# Patient Record
Sex: MALE | Race: WHITE | NOT HISPANIC OR LATINO | URBAN - METROPOLITAN AREA
[De-identification: names, ages, dates, MRNs, and addresses within clinical notes are randomized per-mention and may not be internally consistent; named-entity substitution may affect disease eponyms.]

---

## 2024-08-11 ENCOUNTER — EMERGENCY (EMERGENCY)
Facility: HOSPITAL | Age: 24
LOS: 0 days | Discharge: HOME | End: 2024-08-11
Attending: STUDENT IN AN ORGANIZED HEALTH CARE EDUCATION/TRAINING PROGRAM
Payer: COMMERCIAL

## 2024-08-11 VITALS
HEART RATE: 73 BPM | OXYGEN SATURATION: 100 % | DIASTOLIC BLOOD PRESSURE: 76 MMHG | SYSTOLIC BLOOD PRESSURE: 121 MMHG | RESPIRATION RATE: 18 BRPM | TEMPERATURE: 99 F

## 2024-08-11 VITALS
OXYGEN SATURATION: 100 % | RESPIRATION RATE: 18 BRPM | TEMPERATURE: 98 F | DIASTOLIC BLOOD PRESSURE: 69 MMHG | SYSTOLIC BLOOD PRESSURE: 136 MMHG | HEART RATE: 61 BPM | WEIGHT: 185.41 LBS
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
## 2024-08-11 DIAGNOSIS — M25.532 PAIN IN LEFT WRIST: ICD-10-CM

## 2024-08-11 DIAGNOSIS — S62.002A UNSPECIFIED FRACTURE OF NAVICULAR [SCAPHOID] BONE OF LEFT WRIST, INITIAL ENCOUNTER FOR CLOSED FRACTURE: ICD-10-CM

## 2024-08-11 PROCEDURE — 76376 3D RENDER W/INTRP POSTPROCES: CPT | Mod: 26

## 2024-08-11 PROCEDURE — 73130 X-RAY EXAM OF HAND: CPT | Mod: 26,LT

## 2024-08-11 PROCEDURE — 76376 3D RENDER W/INTRP POSTPROCES: CPT

## 2024-08-11 PROCEDURE — 70450 CT HEAD/BRAIN W/O DYE: CPT | Mod: 26,MC

## 2024-08-11 PROCEDURE — 73130 X-RAY EXAM OF HAND: CPT | Mod: LT

## 2024-08-11 PROCEDURE — 73090 X-RAY EXAM OF FOREARM: CPT | Mod: LT

## 2024-08-11 PROCEDURE — 73110 X-RAY EXAM OF WRIST: CPT | Mod: LT

## 2024-08-11 PROCEDURE — 73200 CT UPPER EXTREMITY W/O DYE: CPT | Mod: MC,LT

## 2024-08-11 PROCEDURE — 99284 EMERGENCY DEPT VISIT MOD MDM: CPT | Mod: 25

## 2024-08-11 PROCEDURE — 73110 X-RAY EXAM OF WRIST: CPT | Mod: 26,LT

## 2024-08-11 PROCEDURE — 72125 CT NECK SPINE W/O DYE: CPT | Mod: 26,MC

## 2024-08-11 PROCEDURE — 72125 CT NECK SPINE W/O DYE: CPT | Mod: MC

## 2024-08-11 PROCEDURE — 70450 CT HEAD/BRAIN W/O DYE: CPT | Mod: MC

## 2024-08-11 PROCEDURE — 70486 CT MAXILLOFACIAL W/O DYE: CPT | Mod: 26,MC

## 2024-08-11 PROCEDURE — 73090 X-RAY EXAM OF FOREARM: CPT | Mod: 26,LT

## 2024-08-11 PROCEDURE — 70486 CT MAXILLOFACIAL W/O DYE: CPT | Mod: MC

## 2024-08-11 PROCEDURE — 76376 3D RENDER W/INTRP POSTPROCES: CPT | Mod: 26,77

## 2024-08-11 PROCEDURE — 99285 EMERGENCY DEPT VISIT HI MDM: CPT

## 2024-08-11 PROCEDURE — 73200 CT UPPER EXTREMITY W/O DYE: CPT | Mod: 26,LT,MC

## 2024-08-11 RX ORDER — ACETAMINOPHEN 500 MG
650 TABLET ORAL ONCE
Refills: 0 | Status: COMPLETED | OUTPATIENT
Start: 2024-08-11 | End: 2024-08-11

## 2024-08-11 RX ADMIN — Medication 650 MILLIGRAM(S): at 14:52

## 2024-08-11 NOTE — ED STATDOCS - CARE PROVIDER_API CALL
Demetrio Nam  Orthopaedic Surgery  [address] [place] [address]  Phone: [phone]  Fax: [phone]  Follow Up Time: 1-3 Days

## 2024-08-11 NOTE — ED STATDOCS - MUSCULOSKELETAL, MLM
Tenderness to the distal wrist, with soft tissue swelling. LEFT HAND: Tenderness to the distal wrist and (+) scaphoid; distal pulses intact; no tenderness to left elbow; humerus, shoulder or clavicle;

## 2024-08-11 NOTE — ED STATDOCS - PATIENT PORTAL LINK FT
You can access the FollowMyHealth Patient Portal offered by [facility] by registering at the following website: [email or web address]. By joining [employer]’s FollowMyHealth portal, you will also be able to view your health information using other applications (apps) compatible with our system.

## 2024-08-11 NOTE — ED ADULT NURSE NOTE - NSFALLRISKINTERV_ED_ALL_ED

## 2024-08-11 NOTE — ED STATDOCS - PROGRESS NOTE DETAILS
Jagdish: scaphoid fx on xray. I, NANCY Felipe have personally discussed with the consulting VIKTOR/attending.  Service: HAND  Name: Viv  Plan: Will see in ED, order ct Jagdish: ct shows fx. seen by hand. dc with fu.  return to ED if symptoms don't continue to improve or recur or develops any new or worsening symptoms that are of concern.

## 2024-08-11 NOTE — CONSULT NOTE ADULT - SUBJECTIVE AND OBJECTIVE BOX
Patient is a 24yMale RHD who presents to [facility] w/ a c/o of left hand pain. Patient states that he fell from a fence and landed on his landed on his left hand. Positive HS, but denies LOC. States ability to walk immediately following the injury. Denies any numbness or tingling. Denies having any other pain elsewhere. Denies any previous orthopedic history. No other orthopedic concerns at this time.            No Known Allergies      PHYSICAL EXAM:  T(C): 36.8 (08-11-24 @ 12:16), Max: 36.8 (08-11-24 @ 12:16)  HR: 61 (08-11-24 @ 12:16) (61 - 61)  BP: 136/69 (08-11-24 @ 12:16) (136/69 - 136/69)  RR: 18 (08-11-24 @ 12:16) (18 - 18)  SpO2: 100% (08-11-24 @ 12:16) (100% - 100%)    Gen: NAD, Resting comfortably  LEFT Upper Extremity:   Skin intact, no erythema, ecchymosis, edema, or gross deformity  TTP over anatomic snuffbox; NTTP over the bony prominences of the shoulder/elbow/wrist/fingers  Painful ROM of hand; Painless passive/active ROM of the shoulder/elbow/wrist/fingers  C5-T1 SILT  Motor grossly intact throughout axillary/musculocutaneous/radial/median/ulnar/AIN/PIN nerves  + radial pulse  Compartments soft and compressible      Secondary Survey:   RLE/LLE/RUE: No TTP over bony prominences, SILT, palpable pulses, full/painless range of motion, compartments soft    Spine: No bony tenderness. No palpable stepoffs.     Imaging: left scaphoid fx, personal read    A/P: 24M who presents with left scaphoid fx    Analgesia  NWB LUE in spicca splint  PT/OT  Ice and elevate as tolerated  No acute orthopedic surgical intervention indicated at this time  Orthopedically stable  FU with Dr. Nam in office tomorrow  Discussed with Dr. Nam who is in agreement with above plan

## 2024-08-11 NOTE — ED STATDOCS - ATTENDING APP SHARED VISIT CONTRIBUTION OF CARE
I, Kenyatta Benavidez DO,  performed the initial face to face bedside interview with this patient regarding history of present illness, review of symptoms and relevant past medical, social and family history.  I completed an independent physical examination.  I was the initial provider who evaluated this patient.   I personally saw the patient and performed a substantive portion of the visit including all aspects of the medical decision making.  I have signed out the follow up of any pending tests (i.e. labs, radiological studies) to the ACP.  I have communicated the patient’s plan of care and disposition with the ACP.  The history, relevant review of systems, past medical and surgical history, medical decision making, and physical examination was documented by the scribe in my presence and I attest to the accuracy of the documentation.

## 2024-08-11 NOTE — ED ADULT NURSE NOTE - CCCP TRG CHIEF CMPLNT
wrist pain/injury O-Z Plasty Text: The defect edges were debeveled with a #15 scalpel blade.  Given the location of the defect, shape of the defect and the proximity to free margins an O-Z plasty (double transposition flap) was deemed most appropriate.  Using a sterile surgical marker, the appropriate transposition flaps were drawn incorporating the defect and placing the expected incisions within the relaxed skin tension lines where possible.    The area thus outlined was incised deep to adipose tissue with a #15 scalpel blade.  The skin margins were undermined to an appropriate distance in all directions utilizing iris scissors.  Hemostasis was achieved with electrocautery.  The flaps were then transposed into place, one clockwise and the other counterclockwise, and anchored with interrupted buried subcutaneous sutures.

## 2024-08-11 NOTE — ED STATDOCS - CLINICAL SUMMARY MEDICAL DECISION MAKING FREE TEXT BOX
24 year old male presenting to the ED c/o left wrist pain after falling from atop a fence from a height of 8 feet onto concrete at 2:30am last night. Plan for ct head, x-rays, pain meds and reevaluate. 24 year old male presenting to the ED c/o left wrist/hand pain after falling from atop a fence from a height of 8 feet onto concrete at 2:30am last night. Plan for ct head, x-rays, pain meds and reevaluate.

## 2024-08-11 NOTE — ED ADULT NURSE NOTE - OBJECTIVE STATEMENT
24yM AOx4 ambulatory, presents to  ED with complaints of 7/10 L wrist pain after falling off of a fence last night. + pulse, denies numbness/tingling. pt also has visible abrasion to the L eye brow. + head strike, denies LOC/ vision changes/ headache. 24yM AOx4 ambulatory, presents to  ED with complaints of 7/10 L wrist pain after falling off of a fence last night. + pulse, denies numbness/tingling. very limited ROM secondary to pain. wrist visibly swollen. pt also has visible abrasion to the L eye brow. + head strike, denies LOC/ vision changes/ headache.

## 2024-08-11 NOTE — ED STATDOCS - NSFOLLOWUPINSTRUCTIONS_ED_ALL_ED_FT
Please use 650mg tylenol (or acetaminophen) every 6 hours and 600mg motrin (or advil or ibuprofen) every 6 hours as needed for pain/discomfort/swelling.  Make sure not to use more than 3500mg in any 24 hour period.     Fracture    A fracture is a break in one of your bones. This can occur from a variety of injuries, especially traumatic ones. Symptoms include pain, bruising, or swelling. Do not use the injured limb. If a fracture is in one of the bones below your waist, do not put weight on that limb unless instructed to do so by your healthcare provider. Crutches or a cane may have been provided. A splint or cast may have been applied by your health care provider. Make sure to keep it dry and follow up with an orthopedist as instructed.    SEEK IMMEDIATE MEDICAL CARE IF YOU HAVE ANY OF THE FOLLOWING SYMPTOMS: numbness, tingling, increasing pain, or weakness in any part of the injured limb. Please use 650mg tylenol (or acetaminophen) every 6 hours and 600mg motrin (or advil or ibuprofen) every 6 hours as needed for pain/discomfort/swelling.  Make sure not to use more than 3500mg in any 24 hour period.     Fracture    A fracture is a break in one of your bones. This can occur from a variety of injuries, especially traumatic ones. Symptoms include pain, bruising, or swelling. Do not use the injured limb. If a fracture is in one of the bones below your waist, do not put weight on that limb unless instructed to do so by your healthcare provider. Crutches or a cane may have been provided. A splint or cast may have been applied by your health care provider. Make sure to keep it dry and follow up with an orthopedist as instructed.    SEEK IMMEDIATE MEDICAL CARE IF YOU HAVE ANY OF THE FOLLOWING SYMPTOMS: numbness, tingling, increasing pain, or weakness in any part of the injured limb.      Please follow-up with your doctor(s) within the next 3 days, but see medical sooner if your symptoms persist or worsen.  Please call tomorrow for an appointment.    CT shows Bilateral paranasal sinus mucosal thickening. Increased number of subcentimeter short axis cervical lymph nodes may be reactive.  You were given a copy of your labs and/or imaging.  Please go-over these with your doctor(s).     If you have any worsening of symptoms or any other concerns please see your doctor or return to the ED immediately.    Please continue taking your home medications as directed

## 2024-08-11 NOTE — ED STATDOCS - OBJECTIVE STATEMENT
24 year old male presenting to the ED c/o left wrist pain after falling from atop a fence from a height of 8 feet onto concrete at 2:30am last night. Positive head strike. Pt reports drinking at the time of the incident. Pt was able to ambulate after falling and had a conversation with his mother shortly afterward. Pt denies neck pain, back pain, chest pain or SOB. Pt's immunizations are up to date. Pt took no pain meds PTA. 24 year old male presenting to the ED c/o left wrist pain after falling from atop a fence from a height of 8 feet onto concrete at 2:30am last night. Positive head strike. Pt reports drinking at the time of the incident. Pt was able to ambulate after falling and had a conversation with his mother shortly afterward. Pt denies neck pain, back pain, chest pain or SOB. Pt's immunizations are up to date. patient is right hand dominant; Pt took no pain meds PTA.

## 2024-08-11 NOTE — ED ADULT TRIAGE NOTE - CHIEF COMPLAINT QUOTE
pt presents to the ED for L wrist pain after falling on his arm. (+)pulses in extremity. pt A&Ox4, well appearing, and ambulatory at baseline with no further complaints or discomforts reported at this time.